# Patient Record
Sex: MALE
[De-identification: names, ages, dates, MRNs, and addresses within clinical notes are randomized per-mention and may not be internally consistent; named-entity substitution may affect disease eponyms.]

---

## 2019-11-14 ENCOUNTER — APPOINTMENT (OUTPATIENT)
Dept: ORTHOPEDIC SURGERY | Facility: CLINIC | Age: 24
End: 2019-11-14
Payer: COMMERCIAL

## 2019-11-14 VITALS — WEIGHT: 157 LBS | HEIGHT: 68 IN | BODY MASS INDEX: 23.79 KG/M2

## 2019-11-14 DIAGNOSIS — S69.92XA UNSPECIFIED INJURY OF LEFT WRIST, HAND AND FINGER(S), INITIAL ENCOUNTER: ICD-10-CM

## 2019-11-14 PROBLEM — Z00.00 ENCOUNTER FOR PREVENTIVE HEALTH EXAMINATION: Status: ACTIVE | Noted: 2019-11-14

## 2019-11-14 PROCEDURE — 73110 X-RAY EXAM OF WRIST: CPT | Mod: LT

## 2019-11-14 PROCEDURE — 99203 OFFICE O/P NEW LOW 30 MIN: CPT

## 2019-11-14 NOTE — HISTORY OF PRESENT ILLNESS
[de-identified] : DELANEY 11/10/19 PATIENT FELL WHILE PLAYING SOCCER - HAD XRAYS AT URGENT CARE SAYS NO FRACTURE - SENT FOR REPEAT XRAYS TODAYPatient is right-hand dominant. He was given a splint in the local urgent care office and radiographs which were read as no fracture. He'll reveals moderately improved since his injury date of 4 days ago.

## 2019-11-14 NOTE — PHYSICAL EXAM
[de-identified] : Left hand is examined patient's full range of motion of the MP joint of the thumb also of the MP joint first finger. Wrist has no tenderness there is tenderness along the ulnar border of the thumb metacarpal. Negative Finkelstein test. There is good extension strength to the digits. He is neurovascularly intact distally. [de-identified] : Imaging today 3 views of the left hand and wrist were obtained showing no evidence of any bony abnormalities no evidence of fracture dislocation malrotation.

## 2019-11-14 NOTE — DISCUSSION/SUMMARY
[de-identified] : Patient has a resolving hand contusion. We have advised continued use of the thumb immobilizing splint he was also given samples of ibuprofen 800 mg combined with Pepcid to take twice a day for the next 5 days. If he is not improved by Tuesday of next week he will cause an MRI of the hand may be warranted.